# Patient Record
Sex: FEMALE
[De-identification: names, ages, dates, MRNs, and addresses within clinical notes are randomized per-mention and may not be internally consistent; named-entity substitution may affect disease eponyms.]

---

## 2024-07-19 ENCOUNTER — DASHBOARD ENCOUNTERS (OUTPATIENT)
Age: 59
End: 2024-07-19

## 2024-07-19 ENCOUNTER — LAB OUTSIDE AN ENCOUNTER (OUTPATIENT)
Dept: RURAL CLINIC 2 | Facility: CLINIC | Age: 59
End: 2024-07-19

## 2024-07-19 ENCOUNTER — OFFICE VISIT (OUTPATIENT)
Dept: RURAL CLINIC 2 | Facility: CLINIC | Age: 59
End: 2024-07-19
Payer: COMMERCIAL

## 2024-07-19 VITALS
HEIGHT: 67 IN | HEART RATE: 110 BPM | TEMPERATURE: 97.3 F | WEIGHT: 191 LBS | DIASTOLIC BLOOD PRESSURE: 100 MMHG | SYSTOLIC BLOOD PRESSURE: 147 MMHG | BODY MASS INDEX: 29.98 KG/M2

## 2024-07-19 DIAGNOSIS — D01.3 CARCINOMA IN SITU OF ANUS AND ANAL CANAL: ICD-10-CM

## 2024-07-19 DIAGNOSIS — R00.0 TACHYCARDIA, UNSPECIFIED: ICD-10-CM

## 2024-07-19 DIAGNOSIS — Z86.010 PERSONAL HISTORY OF COLONIC POLYPS: ICD-10-CM

## 2024-07-19 PROBLEM — 428283002: Status: ACTIVE | Noted: 2024-07-19

## 2024-07-19 PROBLEM — 3424008: Status: ACTIVE | Noted: 2024-07-19

## 2024-07-19 PROBLEM — 92531006: Status: ACTIVE | Noted: 2024-07-19

## 2024-07-19 PROCEDURE — 99243 OFF/OP CNSLTJ NEW/EST LOW 30: CPT | Performed by: NURSE PRACTITIONER

## 2024-07-19 RX ORDER — LEVOTHYROXINE, LIOTHYRONINE 57; 13.5 UG/1; UG/1
1 TABLET ON AN EMPTY STOMACH TABLET ORAL ONCE A DAY
Status: ACTIVE | COMMUNITY

## 2024-07-19 RX ORDER — VENLAFAXINE HYDROCHLORIDE 75 MG/1
1 TABLET WITH FOOD TABLET ORAL ONCE A DAY
Status: ACTIVE | COMMUNITY

## 2024-07-19 NOTE — HPI-TODAY'S VISIT:
60 y/o female referred from Dr. Crum for an abnormal colonoscopy. A copy of this document to be sent to the referring provider. 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 This was followed up with a flexible sigmoidoscopy on 5/24/23 with APC therapy. She said she was then referred to a rectal surgeon and was unhappy with him and did not complete any further w/u. She is here today for further evaluation. She offers no c/o abdominal pain, change in bowels or rectal bleeding. She is currently under a cardiac w/u for tachycardia with halter monitor in place.

## 2024-08-22 ENCOUNTER — TELEPHONE ENCOUNTER (OUTPATIENT)
Dept: RURAL CLINIC 2 | Facility: CLINIC | Age: 59
End: 2024-08-22

## 2024-10-18 ENCOUNTER — OFFICE VISIT (OUTPATIENT)
Dept: RURAL MEDICAL CENTER 2 | Facility: MEDICAL CENTER | Age: 59
End: 2024-10-18

## 2024-10-23 ENCOUNTER — OFFICE VISIT (OUTPATIENT)
Dept: RURAL MEDICAL CENTER 4 | Facility: MEDICAL CENTER | Age: 59
End: 2024-10-23

## 2025-08-26 ENCOUNTER — TELEPHONE ENCOUNTER (OUTPATIENT)
Dept: URBAN - METROPOLITAN AREA CLINIC 6 | Facility: CLINIC | Age: 60
End: 2025-08-26